# Patient Record
Sex: FEMALE | Race: WHITE | Employment: FULL TIME | URBAN - METROPOLITAN AREA
[De-identification: names, ages, dates, MRNs, and addresses within clinical notes are randomized per-mention and may not be internally consistent; named-entity substitution may affect disease eponyms.]

---

## 2019-05-26 ENCOUNTER — HOSPITAL ENCOUNTER (EMERGENCY)
Facility: CLINIC | Age: 54
Discharge: HOME | End: 2019-05-26
Attending: EMERGENCY MEDICINE | Admitting: EMERGENCY MEDICINE
Payer: COMMERCIAL

## 2019-05-26 ENCOUNTER — APPOINTMENT (OUTPATIENT)
Dept: GENERAL RADIOLOGY | Facility: CLINIC | Age: 54
End: 2019-05-26
Attending: EMERGENCY MEDICINE
Payer: COMMERCIAL

## 2019-05-26 VITALS
OXYGEN SATURATION: 90 % | DIASTOLIC BLOOD PRESSURE: 80 MMHG | RESPIRATION RATE: 18 BRPM | HEART RATE: 92 BPM | SYSTOLIC BLOOD PRESSURE: 129 MMHG | TEMPERATURE: 98 F

## 2019-05-26 DIAGNOSIS — R09.02 HYPOXIA: ICD-10-CM

## 2019-05-26 DIAGNOSIS — R07.89 LEFT-SIDED CHEST WALL PAIN: ICD-10-CM

## 2019-05-26 DIAGNOSIS — J44.1 COPD EXACERBATION (H): ICD-10-CM

## 2019-05-26 LAB
ANION GAP SERPL CALCULATED.3IONS-SCNC: 7 MMOL/L (ref 3–14)
BASOPHILS # BLD AUTO: 0 10E9/L (ref 0–0.2)
BASOPHILS NFR BLD AUTO: 0.3 %
BUN SERPL-MCNC: 10 MG/DL (ref 7–30)
CALCIUM SERPL-MCNC: 9.3 MG/DL (ref 8.5–10.1)
CHLORIDE SERPL-SCNC: 99 MMOL/L (ref 94–109)
CO2 SERPL-SCNC: 29 MMOL/L (ref 20–32)
CREAT SERPL-MCNC: 0.55 MG/DL (ref 0.52–1.04)
D DIMER PPP FEU-MCNC: 0.4 UG/ML FEU (ref 0–0.5)
DIFFERENTIAL METHOD BLD: ABNORMAL
EOSINOPHIL # BLD AUTO: 0 10E9/L (ref 0–0.7)
EOSINOPHIL NFR BLD AUTO: 0.3 %
ERYTHROCYTE [DISTWIDTH] IN BLOOD BY AUTOMATED COUNT: 12.9 % (ref 10–15)
GFR SERPL CREATININE-BSD FRML MDRD: >90 ML/MIN/{1.73_M2}
GLUCOSE SERPL-MCNC: 107 MG/DL (ref 70–99)
HCT VFR BLD AUTO: 44.9 % (ref 35–47)
HGB BLD-MCNC: 15.6 G/DL (ref 11.7–15.7)
IMM GRANULOCYTES # BLD: 0.1 10E9/L (ref 0–0.4)
IMM GRANULOCYTES NFR BLD: 0.6 %
INTERPRETATION ECG - MUSE: NORMAL
LYMPHOCYTES # BLD AUTO: 1.5 10E9/L (ref 0.8–5.3)
LYMPHOCYTES NFR BLD AUTO: 11.4 %
MCH RBC QN AUTO: 33.5 PG (ref 26.5–33)
MCHC RBC AUTO-ENTMCNC: 34.7 G/DL (ref 31.5–36.5)
MCV RBC AUTO: 96 FL (ref 78–100)
MONOCYTES # BLD AUTO: 0.9 10E9/L (ref 0–1.3)
MONOCYTES NFR BLD AUTO: 7.1 %
NEUTROPHILS # BLD AUTO: 10.2 10E9/L (ref 1.6–8.3)
NEUTROPHILS NFR BLD AUTO: 80.3 %
NRBC # BLD AUTO: 0 10*3/UL
NRBC BLD AUTO-RTO: 0 /100
PLATELET # BLD AUTO: 284 10E9/L (ref 150–450)
POTASSIUM SERPL-SCNC: 3.3 MMOL/L (ref 3.4–5.3)
RBC # BLD AUTO: 4.66 10E12/L (ref 3.8–5.2)
SODIUM SERPL-SCNC: 135 MMOL/L (ref 133–144)
TROPONIN I SERPL-MCNC: <0.015 UG/L (ref 0–0.04)
WBC # BLD AUTO: 12.7 10E9/L (ref 4–11)

## 2019-05-26 PROCEDURE — 25000132 ZZH RX MED GY IP 250 OP 250 PS 637: Performed by: EMERGENCY MEDICINE

## 2019-05-26 PROCEDURE — 85025 COMPLETE CBC W/AUTO DIFF WBC: CPT | Performed by: EMERGENCY MEDICINE

## 2019-05-26 PROCEDURE — 93005 ELECTROCARDIOGRAM TRACING: CPT

## 2019-05-26 PROCEDURE — 94640 AIRWAY INHALATION TREATMENT: CPT

## 2019-05-26 PROCEDURE — 25000131 ZZH RX MED GY IP 250 OP 636 PS 637: Performed by: EMERGENCY MEDICINE

## 2019-05-26 PROCEDURE — 99285 EMERGENCY DEPT VISIT HI MDM: CPT | Mod: 25

## 2019-05-26 PROCEDURE — 80048 BASIC METABOLIC PNL TOTAL CA: CPT | Performed by: EMERGENCY MEDICINE

## 2019-05-26 PROCEDURE — 25000128 H RX IP 250 OP 636: Performed by: EMERGENCY MEDICINE

## 2019-05-26 PROCEDURE — 85379 FIBRIN DEGRADATION QUANT: CPT | Performed by: EMERGENCY MEDICINE

## 2019-05-26 PROCEDURE — 96374 THER/PROPH/DIAG INJ IV PUSH: CPT

## 2019-05-26 PROCEDURE — 71046 X-RAY EXAM CHEST 2 VIEWS: CPT

## 2019-05-26 PROCEDURE — 25000125 ZZHC RX 250: Performed by: EMERGENCY MEDICINE

## 2019-05-26 PROCEDURE — 84484 ASSAY OF TROPONIN QUANT: CPT | Performed by: EMERGENCY MEDICINE

## 2019-05-26 RX ORDER — IPRATROPIUM BROMIDE AND ALBUTEROL SULFATE 2.5; .5 MG/3ML; MG/3ML
3 SOLUTION RESPIRATORY (INHALATION) ONCE
Status: COMPLETED | OUTPATIENT
Start: 2019-05-26 | End: 2019-05-26

## 2019-05-26 RX ORDER — LIDOCAINE 50 MG/G
1 PATCH TOPICAL EVERY 24 HOURS
Qty: 10 PATCH | Refills: 0 | Status: SHIPPED | OUTPATIENT
Start: 2019-05-26 | End: 2019-06-05

## 2019-05-26 RX ORDER — HYDROCHLOROTHIAZIDE 12.5 MG/1
12.5 CAPSULE ORAL DAILY
COMMUNITY

## 2019-05-26 RX ORDER — CELECOXIB 100 MG/1
100 CAPSULE ORAL DAILY
COMMUNITY

## 2019-05-26 RX ORDER — POTASSIUM CHLORIDE 1500 MG/1
20 TABLET, EXTENDED RELEASE ORAL ONCE
Status: COMPLETED | OUTPATIENT
Start: 2019-05-26 | End: 2019-05-26

## 2019-05-26 RX ORDER — LIDOCAINE 4 G/G
1 PATCH TOPICAL ONCE
Status: DISCONTINUED | OUTPATIENT
Start: 2019-05-26 | End: 2019-05-26 | Stop reason: HOSPADM

## 2019-05-26 RX ORDER — PREDNISONE 20 MG/1
60 TABLET ORAL ONCE
Status: COMPLETED | OUTPATIENT
Start: 2019-05-26 | End: 2019-05-26

## 2019-05-26 RX ORDER — AMLODIPINE BESYLATE 10 MG/1
10 TABLET ORAL DAILY
COMMUNITY

## 2019-05-26 RX ORDER — KETOROLAC TROMETHAMINE 15 MG/ML
15 INJECTION, SOLUTION INTRAMUSCULAR; INTRAVENOUS ONCE
Status: COMPLETED | OUTPATIENT
Start: 2019-05-26 | End: 2019-05-26

## 2019-05-26 RX ORDER — PREDNISONE 50 MG/1
TABLET ORAL
Qty: 4 TABLET | Refills: 0 | Status: SHIPPED | OUTPATIENT
Start: 2019-05-26

## 2019-05-26 RX ADMIN — IPRATROPIUM BROMIDE AND ALBUTEROL SULFATE 3 ML: .5; 3 SOLUTION RESPIRATORY (INHALATION) at 17:18

## 2019-05-26 RX ADMIN — LIDOCAINE 1 PATCH: 560 PATCH PERCUTANEOUS; TOPICAL; TRANSDERMAL at 17:15

## 2019-05-26 RX ADMIN — PREDNISONE 60 MG: 20 TABLET ORAL at 19:17

## 2019-05-26 RX ADMIN — KETOROLAC TROMETHAMINE 15 MG: 15 INJECTION, SOLUTION INTRAMUSCULAR; INTRAVENOUS at 17:18

## 2019-05-26 RX ADMIN — POTASSIUM CHLORIDE 20 MEQ: 1500 TABLET, EXTENDED RELEASE ORAL at 17:30

## 2019-05-26 ASSESSMENT — ENCOUNTER SYMPTOMS
COUGH: 0
SHORTNESS OF BREATH: 1
NAUSEA: 0
LIGHT-HEADEDNESS: 0
VOMITING: 0
FEVER: 0
DIAPHORESIS: 0

## 2019-05-26 NOTE — ED PROVIDER NOTES
"  History     Chief Complaint:  Shortness of breath and Chest pain    The history is provided by the patient.      Yael Ordonez is a 53 year old female smoker with a history of COPD and HTN who presents with shortness of breath and chest pain. Yael was out drinking (had 3 drinks) at a bar when she acutely developed 9/10 left anterior, sharp chest pain about 2 hours prior to arrival. Currently this pain has resolved and instead she has an achy, more mild-moderate pain (6/10) in the inferior and lateral left chest like \"bruised ribs\". She reports associated shortness of breath and a pleuritic component. While she has been dyspneic due to her COPD in the past, she has never had pain like this. She did take aspirin and used her inhaler with some relief. She went to Urgent Care and was sent to the ER. Notably, she walked from Urgent Care and exetion did not worsen her pain. Yael denies fever, cough, diaphoresis, nausea, vomiting, or lightheadedness. She denies first degree relative with heart disease or VTE but there are \"some heart problems on my mother's side.\" She has had no recent travel or surgery.    Allergies:  Season allergies  Lisinopril     Medications:    Norvasc  Celebrex  Microzide     Past Medical History:    COPD  Hypertension  Rheumatoid arthritis    Past Surgical History:    Surgical history reviewed. No pertinent surgical history.    Family History:    Family history reviewed. No pertinent family history.  As per HPI    Social History:  The patient was accompanied to the ED by .  Smoking Status: Current Smoker, Packs/day: 1.0  Smokeless Tobacco: Former User  Alcohol Use: Positive  Drug Use: Negative  Marital Status:      Review of Systems   Constitutional: Negative for diaphoresis and fever.   Respiratory: Positive for shortness of breath. Negative for cough.    Cardiovascular: Positive for chest pain.   Gastrointestinal: Negative for nausea and vomiting.   Neurological: Negative for " light-headedness.   All other systems reviewed and are negative.    Physical Exam     Patient Vitals for the past 24 hrs:   BP Temp Temp src Pulse Heart Rate Resp SpO2   05/26/19 1910 -- -- -- -- -- -- 90 %   05/26/19 1900 -- -- -- -- -- -- (!) 86 %   05/26/19 1845 -- -- -- -- 99 -- 93 %   05/26/19 1830 129/80 -- -- 92 93 -- 93 %   05/26/19 1815 144/90 -- -- 90 91 -- 98 %   05/26/19 1800 143/78 -- -- -- -- -- 96 %   05/26/19 1730 137/79 -- -- 90 91 -- (!) 87 %   05/26/19 1715 (!) 144/91 -- -- 91 91 -- 91 %   05/26/19 1700 125/81 -- -- 87 89 -- 93 %   05/26/19 1645 131/80 -- -- 89 90 -- 92 %   05/26/19 1630 140/86 -- -- 94 -- -- 90 %   05/26/19 1625 133/84 98  F (36.7  C) Oral 92 -- 18 (!) 88 %     Physical Exam  General: Well-developed and well-nourished. Well appearing middle aged  woman. Cooperative.  Head:  Atraumatic.  Eyes:  Conjunctivae, lids, and sclerae are normal.  ENT:    Normal nose. Moist mucous membranes.  Neck:  Supple. Normal range of motion.  CV:  Regular rate and rhythm. Normal heart sounds with no murmurs, rubs, or gallops detected.  Resp:  No respiratory distress. Diffuse mildly coarse breath sounds without tena wheezing or rales.   GI:  Soft. Non-distended. Non-tender.    MS:  Normal ROM. No bilateral lower extremity edema or calf tenderness. Mild tenderness to palpation over the left lateral chest wall without skin changes.  Skin:  Warm. Non-diaphoretic. No pallor.  Neuro: Awake. A&Ox3. Normal strength.  Psych:  Normal mood and affect. Normal speech.  Vitals reviewed.    Emergency Department Course   EKG  Time: 1630  Rate 94 bpm. WV interval 174. QRS duration 102. QT/QTc 376/470.   Normal sinus rhythm  Possible left atrial enlargement  Borderline ECG   No acute ST changes.  No prior EKG for comparison.    Imaging:  Radiology findings were communicated with the patient who voiced understanding of the findings.    XR Chest 2 Views  No acute disease.   ANN LOMBARDO MD  Reading per  radiology    Laboratory:  Laboratory findings were communicated with the patient who voiced understanding of the findings.    D Dimer: 0.4  CBC: WBC 12.7 (H), HGB 15.6,   BMP: potassium 3.3 (L), glucose 107 (H) o/w WNL (Creatinine 0.55)  Troponin (Collected 1639): <0.015    Interventions:  1715 Lidocare 1 patch transdermal  1718 Duoneb 3 mL nebulization  1718 Toradol 15 mg IV  1730 K-Dur 20 mEq Oral  1917 Deltasone 60 mg Oral    Emergency Department Course:    1625 Nursing notes and vitals reviewed.    1635 I performed an exam of the patient as documented above.     1639 IV was inserted and blood was drawn for laboratory testing, results above.    1742 The patient was sent for a Xray while in the emergency department, results above.     1834 Patient rechecked and updated.     1917 I discussed risks of leaving against medical advice. Patient signed AMA.    1923 I personally reviewed the laboratory and imaging results with the patient and answered all related questions prior to leaving AMA.    Impression & Plan      Medical Decision Making:  Yael is a 53 year old woman with COPD who presents with acute onset of left chest pain that was initially anterior and sharp and is now more lateral and dull.  She feels dyspneic and notes that pain is pleuritic in nature.  She denies all other concerns or complaints and appears well on exam though she was hypoxic to 88% on room air.  Lungs have no tena wheezing with coarse breath sounds throughout.  She does have a mild degree of tenderness over the lateral left chest wall.  Although she has no risk factors for PE, given acute onset of pain and hypoxia this is certainly a concern.  Fortunately, D-dimer is negative which helps rule this out.  The remainder of the laboratory tests are overall reassuring although she does have a mild leukocytosis to 12.7 of uncertain etiology as she denies infectious symptoms and chest x-ray reveals no pneumonia.  Similarly, there is no  pneumothorax or pleural effusion to explain her pain.  She had a mild hypokalemia which was repleted with 20 mEq of potassium chloride by mouth though there is no kidney injury.  Fortunately, her EKG is reassuring without acute ST changes or arrhythmias.  Initial troponin obtained about 2 hours after onset of pain is negative.  During her work-up patient was given Toradol and a lidocaine patch and noted continued improvement in pain.  She was also given a DuoNeb and supplemental oxygen for hypoxia and reported that her shortness of breath was improved.  However, when supplemental oxygen is removed patient is consistently 89 to 91% on room air at rest and on ambulatory pulse oximetry decreases to 85%.  As such, I have recommended patient have CT scan of her chest as she does not have tena wheezing to support a COPD exacerbation diagnosis and with her acute onset of pain and hypoxia without other good explanation I think CT scan is imperative to evaluate for PE, aortic pathology, occult PNA, et. al.  I have also recommended a repeat troponin at 4 hours to further eliminate ACS as possible cause and ultimately I believe patient requires admission as she is persistently hypoxic and requiring supplemental oxygen.  However, patient adamantly refuses all of these interventions including troponin, CT, and admission.  She wishes to leave AMA.  She understands the risks of leaving AMA including, but not limited to, further respiratory decline resulting in respiratory arrest and death as well as cardiac arrest with permanent disability and death.  However, patient states she has had been this hypoxic before at her primary care provider's office and she feels ready for discharge. She accepts responsibility for decision and has capacity to make it. Most likely symptoms are related to COPD exacerbation so I think it is prudent to treat her as such including a short burst of prednisone with first dose here.  However, again, she  had no wheezing and this exacerbation would not explain her chest pain so further testing is needed, which she has refused. Patient agrees to continue Tylenol, ibuprofen, and Lidoderm patches for her chest pain and we discussed use of albuterol and continued prednisone for presumed COPD exacerbation.  She agrees to call her doctor first thing on Tuesday as, unfortunately, tomorrow is a holiday.  Patient understands risks of leaving AGAINST MEDICAL ADVICE and also understands she is welcome to return at any time should she have worsening of symptoms or simply if she changes her mind regarding continued work-up and admission.  All questions answered.    Diagnosis:    ICD-10-CM    1. COPD exacerbation (H) J44.1    2. Hypoxia R09.02    3. Left-sided chest wall pain R07.89      Disposition:   Left AMA.    Discharge Medications:     Review of your medicines         START taking      Dose / Directions   lidocaine 5 % patch  Commonly known as:  LIDODERM      Dose:  1 patch  Place 1 patch onto the skin every 24 hours for 10 days  Quantity:  10 patch  Refills:  0     predniSONE 50 MG tablet  Commonly known as:  DELTASONE      Take 1 tablet by mouth daily for 4 days.  Quantity:  4 tablet  Refills:  0           Where to get your medicines      Some of these will need a paper prescription and others can be bought over the counter. Ask your nurse if you have questions.    Bring a paper prescription for each of these medications    lidocaine 5 % patch    predniSONE 50 MG tablet         Scribe Disclosure:  I, Ramón Diaz, am serving as a scribe at 4:39 PM on 5/26/2019 to document services personally performed by Tosha Knight MD based on my observations and the provider's statements to me.        [facility] EMERGENCY DEPARTMENT       Tosha Knight MD  05/29/19 [id number]

## 2019-05-26 NOTE — ED TRIAGE NOTES
Patient arrived at around 16:30 complaining of left sided chest pain and shortness of breath. Pain started as a sharp pain and is now dull. Reports pain with deep breaths. Oxygen saturation around 88% on room air. Oxygen saturation improved after nasal cannula placed with supplemental oxygen. Reports improvement in shortness of breath after supplemental oxygen. Denies recent travel. ABCs intact. Alert and oriented X4. Oriented to room and call light. Patient educated about hand hygiene practices.

## 2019-05-26 NOTE — ED AVS SNAPSHOT
[facility] Emergency Department  [address]  [facility] [address]  Phone:  [phone]  Fax:  [phone]                                    Yael Ordonez   MRN: [id number]    Department:  [facility] Emergency Department   Date of Visit:  5/26/2019           After Visit Summary Signature Page    I have received my discharge instructions, and my questions have been answered. I have discussed any challenges I see with this plan with the nurse or doctor.    ..........................................................................................................................................  Patient/Patient Representative Signature      ..........................................................................................................................................  Patient Representative Print Name and Relationship to Patient    ..................................................               ................................................  Date                                   Time    ..........................................................................................................................................  Reviewed by Signature/Title    ...................................................              ..............................................  Date                                               Time          22EPIC Rev 08/18

## 2019-05-27 NOTE — DISCHARGE INSTRUCTIONS
For chest pain use Lidoderm patch, Tylenol, or ibuprofen.  For shortness of breath use albuterol every 4 hours for the first 24 hours then as needed.  Take steroids as directed with first dose tomorrow.  Return immediately if you have worsening chest pain, shortness of breath, or simply if you change your mind regarding completion of work-up or admission.  Otherwise, call your doctor first thing Tuesday for a first available appointment for follow-up after ER visit.